# Patient Record
Sex: FEMALE | Race: NATIVE HAWAIIAN OR OTHER PACIFIC ISLANDER
[De-identification: names, ages, dates, MRNs, and addresses within clinical notes are randomized per-mention and may not be internally consistent; named-entity substitution may affect disease eponyms.]

---

## 2018-10-15 ENCOUNTER — HOSPITAL ENCOUNTER (OUTPATIENT)
Dept: HOSPITAL 31 - C.SDS | Age: 83
Discharge: HOME | End: 2018-10-15
Attending: UROLOGY
Payer: MEDICAID

## 2018-10-15 VITALS
TEMPERATURE: 97.8 F | HEART RATE: 82 BPM | OXYGEN SATURATION: 97 % | SYSTOLIC BLOOD PRESSURE: 165 MMHG | DIASTOLIC BLOOD PRESSURE: 78 MMHG

## 2018-10-15 VITALS — BODY MASS INDEX: 25.9 KG/M2

## 2018-10-15 VITALS — RESPIRATION RATE: 18 BRPM

## 2018-10-15 DIAGNOSIS — N30.80: Primary | ICD-10-CM

## 2018-10-15 DIAGNOSIS — D41.4: ICD-10-CM

## 2018-10-15 PROCEDURE — 88305 TISSUE EXAM BY PATHOLOGIST: CPT

## 2018-10-15 PROCEDURE — 52235 CYSTOSCOPY AND TREATMENT: CPT

## 2018-10-15 NOTE — [id number]
Surgeon's Initial Post Op Note





- Surgeon's Notes


Surgeon: Kavya


Assistant: MATTHEW


Type of Anesthesia: General LMA


Anesthesia Administered By: Staff


Pre-Operative Diagnosis: Bladder tumor


Operative Findings: Medium BT


Post-Operative Diagnosis: Medium BT


Operation Performed: TURBT MEDIUM


Specimen/Specimens Removed: BT


Estimated Blood Loss: EBL {In ML}: 0


Blood Products Given: N/A


Drains Used: No Drains


Post-Op Condition: Good


Date of Surgery/Procedure: 10/15/18


Time of Surgery/Procedure: 13:56

## 2018-10-16 NOTE — OP
PROCEDURE DATE:  10/15/2018



PREOPERATIVE DIAGNOSIS:  Bladder mass.



POSTOPERATIVE DIAGNOSIS:  Medium size bladder tumor.



PROCEDURE:  TURBT medium.



DESCRIPTION OF PROCEDURE:  Prior to the procedure, a detailed informed

consent was obtained from the patient with the assistance of a . 

The patient was made aware of all risk and complications and other ways of

managing bladder cancer.  The patient was brought into the room and a

time-out was taken.  She was given a prophylactic dose of antibiotics and

cystoscoped with #21 Storz panendoscope.  There was noted to be a tumor at

the base of the trigone well away from the ureteral orifice.  A continuous

flow resectoscope was then inserted after removing the cystoscope and the

growth was resected down to its base which was fulgurated extensively.  The

tumor did not appear invasive.  There was no involvement of either ureteral

orifice.  The patient tolerated this procedure well.  The patient was sent

to the recovery room in good condition.  The family and the patient were

given explanation of necessary followup, possible complications, and their

management.







__________________________________________

Arpan Hoff MD





DD:  10/16/2018 9:06:37

DT:  10/16/2018 10:20:20

Job # [id number]